# Patient Record
Sex: MALE
[De-identification: names, ages, dates, MRNs, and addresses within clinical notes are randomized per-mention and may not be internally consistent; named-entity substitution may affect disease eponyms.]

---

## 2020-03-11 PROBLEM — Z00.00 ENCOUNTER FOR PREVENTIVE HEALTH EXAMINATION: Status: ACTIVE | Noted: 2020-03-11

## 2020-03-12 ENCOUNTER — APPOINTMENT (OUTPATIENT)
Dept: SURGERY | Facility: CLINIC | Age: 80
End: 2020-03-12
Payer: MEDICARE

## 2020-03-12 VITALS
SYSTOLIC BLOOD PRESSURE: 149 MMHG | HEIGHT: 66 IN | WEIGHT: 160 LBS | DIASTOLIC BLOOD PRESSURE: 76 MMHG | HEART RATE: 78 BPM | BODY MASS INDEX: 25.71 KG/M2

## 2020-03-12 DIAGNOSIS — R22.9 LOCALIZED SWELLING, MASS AND LUMP, UNSPECIFIED: ICD-10-CM

## 2020-03-12 DIAGNOSIS — Z82.3 FAMILY HISTORY OF STROKE: ICD-10-CM

## 2020-03-12 PROCEDURE — 99203 OFFICE O/P NEW LOW 30 MIN: CPT | Mod: 25

## 2020-03-12 PROCEDURE — 10060 I&D ABSCESS SIMPLE/SINGLE: CPT

## 2020-03-12 RX ORDER — PRAVASTATIN SODIUM 80 MG/1
TABLET ORAL
Refills: 0 | Status: ACTIVE | COMMUNITY

## 2020-03-12 RX ORDER — LISINOPRIL 30 MG/1
TABLET ORAL
Refills: 0 | Status: ACTIVE | COMMUNITY

## 2020-03-13 NOTE — CONSULT LETTER
[Dear  ___] : Dear  [unfilled], [Consult Letter:] : I had the pleasure of evaluating your patient, [unfilled]. [Please see my note below.] : Please see my note below. [Consult Closing:] : Thank you very much for allowing me to participate in the care of this patient.  If you have any questions, please do not hesitate to contact me. [Sincerely,] : Sincerely, [FreeTextEntry3] : Greg Schaefer MD, FACS

## 2020-03-13 NOTE — HISTORY OF PRESENT ILLNESS
[de-identified] : This is a 79 year  old patient who was referred by Dr. Mariano Bahena with the chief complaint of having  right chest wall infected mass.  He reports having this condition for 2 weeks . He denies any trauma to the area.   He denies any fever or  night sweats. Appetite is good and weight is stable.  He states that   he had I&D of the mass 20 years ago and now it has returned. He wants to know if it could  be surgically  removed.\par \par

## 2020-03-13 NOTE — PLAN
[FreeTextEntry1] : Mr. TRELL QUIGLEY  was told significance of findings, options, risks and benefits were explained.   All surgical options were discussed including non-surgical treatments.  the mass is infected and  should not be removed now. patient was advised to have the Incision and drainage of the infected mass.  He wishes to proceed with I&D. after  the area was cleaned and prepped the mass was infiltrated with Lidocaine . the cyst was incised. contents of the cyst evacuated.  wound care discussed and demonstrated to patient and his wife.  patient will return in two weeks or if needed. \par Patient advised to seek immediate medical attention with any acute change in symptoms or with the development of any new or worsening symptoms.  Patient's questions and concerns addressed to patient's satisfaction, and patient verbalized an understanding of the information discussed.\par \par

## 2020-03-13 NOTE — PHYSICAL EXAM
[Alert] : alert [Oriented to Person] : oriented to person [Oriented to Place] : oriented to place [Oriented to Time] : oriented to time [Calm] : calm [de-identified] : He  is alert, well-groomed, and cheerful.\par   [de-identified] :   anicteric.  Nasal mucosa pink, septum midline. Oral mucosa pink.  Tongue midline, Pharynx without exudates.\par   [de-identified] :  Neck supple. Trachea midline. Thyroid isthmus barely palpable, lobes not felt.\par   [de-identified] :  right chest wall infected mass is  mobile,  Tender,   ill defined. Superficial. No palpable lymph nodes.   Mass size - 3  cm x  5  cm.

## 2020-03-19 PROBLEM — L72.9 INFECTED CYST OF SKIN: Status: ACTIVE | Noted: 2020-03-12

## 2020-03-19 NOTE — HISTORY OF PRESENT ILLNESS
[de-identified] : MrMarcelino QUIGLEY  is s/p Incision and drainage of the infected right chest wall mass on 03/12/2020.

## 2020-03-26 ENCOUNTER — APPOINTMENT (OUTPATIENT)
Dept: SURGERY | Facility: CLINIC | Age: 80
End: 2020-03-26

## 2020-03-26 DIAGNOSIS — L08.9 FOLLICULAR CYST OF THE SKIN AND SUBCUTANEOUS TISSUE, UNSPECIFIED: ICD-10-CM

## 2020-03-26 DIAGNOSIS — L72.9 FOLLICULAR CYST OF THE SKIN AND SUBCUTANEOUS TISSUE, UNSPECIFIED: ICD-10-CM

## 2024-07-11 ENCOUNTER — APPOINTMENT (OUTPATIENT)
Dept: SURGERY | Facility: CLINIC | Age: 84
End: 2024-07-11